# Patient Record
Sex: FEMALE | Race: WHITE | Employment: FULL TIME | URBAN - METROPOLITAN AREA
[De-identification: names, ages, dates, MRNs, and addresses within clinical notes are randomized per-mention and may not be internally consistent; named-entity substitution may affect disease eponyms.]

---

## 2019-04-07 ENCOUNTER — APPOINTMENT (OUTPATIENT)
Dept: GENERAL RADIOLOGY | Age: 34
End: 2019-04-07
Payer: COMMERCIAL

## 2019-04-07 ENCOUNTER — HOSPITAL ENCOUNTER (EMERGENCY)
Age: 34
Discharge: HOME | End: 2019-04-07
Attending: EMERGENCY MEDICINE
Payer: COMMERCIAL

## 2019-04-07 VITALS
OXYGEN SATURATION: 99 % | DIASTOLIC BLOOD PRESSURE: 95 MMHG | HEART RATE: 71 BPM | RESPIRATION RATE: 16 BRPM | SYSTOLIC BLOOD PRESSURE: 148 MMHG | WEIGHT: 189.5 LBS

## 2019-04-07 DIAGNOSIS — S62.501A CLOSED NONDISPLACED FRACTURE OF PHALANX OF RIGHT THUMB, UNSPECIFIED PHALANX, INITIAL ENCOUNTER: Primary | ICD-10-CM

## 2019-04-07 PROCEDURE — 73130 X-RAY EXAM OF HAND: CPT

## 2019-04-07 PROCEDURE — 99283 EMERGENCY DEPT VISIT LOW MDM: CPT

## 2019-04-07 RX ORDER — OXYCODONE HYDROCHLORIDE AND ACETAMINOPHEN 5; 325 MG/1; MG/1
1-2 TABLET ORAL EVERY 8 HOURS PRN
Qty: 7 TABLET | Refills: 0 | Status: SHIPPED | OUTPATIENT
Start: 2019-04-07 | End: 2019-04-14

## 2019-04-07 RX ORDER — IBUPROFEN 800 MG/1
800 TABLET ORAL EVERY 6 HOURS PRN
COMMUNITY
End: 2020-05-24

## 2019-04-07 ASSESSMENT — PAIN DESCRIPTION - PAIN TYPE: TYPE: ACUTE PAIN

## 2019-04-07 ASSESSMENT — PAIN DESCRIPTION - LOCATION: LOCATION: FINGER (COMMENT WHICH ONE)

## 2019-04-07 ASSESSMENT — PAIN DESCRIPTION - DESCRIPTORS: DESCRIPTORS: CONSTANT

## 2019-04-07 ASSESSMENT — PAIN DESCRIPTION - ORIENTATION: ORIENTATION: RIGHT

## 2019-04-07 ASSESSMENT — PAIN SCALES - GENERAL: PAINLEVEL_OUTOF10: 6

## 2019-04-08 NOTE — ED PROVIDER NOTES
[facility]   eMERGENCY dEPARTMENT eNCOUnter      Pt Name: Jennifer Lozano  MRN: [id number]  [place] 1985  Date of evaluation: 4/7/2019  Provider: Rosana Falcon MD  PCP: Lj Pandya DO      CHIEF COMPLAINT       Chief Complaint   Patient presents with    Finger Injury     Pt slammed right thumb into car door. +Swelling/Bruising       HISTORY OFPRESENT ILLNESS   (Location/Symptom, Timing/Onset, Context/Setting, Quality, Duration, Modifying Factors,Severity)  Note limiting factors. Jennifer Lozano is a 37 y.o. female  And her finger in a door it's the right hand thumb she says she is left-handed having 6 out of 10 pain it happened about an hour ago  Nursing Notes were all reviewed and agreed with or any disagreements were addressed  in the HPI. REVIEW OF SYSTEMS    (2-9 systems for level 4, 10 or more for level 5)     Review of Systems    Positives and Pertinent negatives as per HPI. Except as noted above in the ROS, all other systems were reviewed andnegative. PASTMEDICAL HISTORY   History reviewed. No pertinent past medical history. SURGICAL HISTORY       Past Surgical History:   Procedure Laterality Date    LEEP           CURRENT MEDICATIONS       Discharge Medication List as of 4/7/2019  8:57 PM      CONTINUE these medications which have NOT CHANGED    Details   ibuprofen (ADVIL;MOTRIN) 800 MG tablet Take 800 mg by mouth every 6 hours as needed for PainHistorical Med             ALLERGIES     Patient has no known allergies. FAMILY HISTORY     History reviewed. No pertinent family history.        SOCIAL HISTORY       Social History     Socioeconomic History    Marital status:      Spouse name: None    Number of children: None    Years of education: None    Highest education level: None   Occupational History    None   Social Needs    Financial resource strain: None    Food insecurity:     Worry: None     Inability: None    Transportation needs:     Medical: None     Non-medical: None   Tobacco Use    Smoking status: Never Smoker    Smokeless tobacco: Never Used   Substance and Sexual Activity    Alcohol use: None    Drug use: None    Sexual activity: None   Lifestyle    Physical activity:     Days per week: None     Minutes per session: None    Stress: None   Relationships    Social connections:     Talks on phone: None     Gets together: None     Attends [employer] service: None     Active member of club or organization: None     Attends meetings of clubs or organizations: None     Relationship status: None    Intimate partner violence:     Fear of current or ex partner: None     Emotionally abused: None     Physically abused: None     Forced sexual activity: None   Other Topics Concern    None   Social History Narrative    None       SCREENINGS      @FLOW([id number])@      PHYSICAL EXAM    (up to 7 for level 4, 8 or more for level 5)     ED Triage Vitals [04/07/19 2018]   BP Temp Temp Source Pulse Resp SpO2 Height Weight   (!) 148/95 -- Oral 71 16 99 % -- 189 lb 8 oz (86 kg)       Physical Exam      General Appearance:  Alert, cooperative, no distress, appears stated age. Head:  Normocephalic, without obviousabnormality, atraumatic. Eyes:  conjunctiva/corneas clear, EOM's intact. Sclera anicteric. ENT: Mucous membranes moist.                    There is a small subungual hematoma it measures less than a quarter of the nail bed there is also some bruising posteriorly of the thumb Refills less than 2 seconds   Extremities: No edema, cords or calf tenderness. Full range of motion. Pulses: 2+ and symmetric   Skin: Turgor is normal, no rashes or lesions. Neurologic: Alert and oriented X 3. No focal findings.   Motor grossly normal.  Speech clear, no drift, CN III-XII grossly intact,        DIAGNOSTIC RESULTS   LABS:    Labs Reviewed - No data to display    All other labs were within normal range or not returned as of this dictation. EKG: All EKG's are interpreted by the Emergency Department Physician who eithersigns or Co-signs this chart in the absence of a cardiologist.        RADIOLOGY:   Non-plain film images such as CT, Ultrasound and MRI are read by the radiologist. Plain radiographic images are visualized by myself. *    Interpretation per the Radiologist below, if available at the time of this note:    XR HAND RIGHT (MIN 3 VIEWS)   Final Result   Impression:    First distal phalanx fracture. PROCEDURES   Unless otherwise noted below, none     Procedures    *    CRITICAL CARE TIME   N/A      EMERGENCY DEPARTMENT COURSE and DIFFERENTIALDIAGNOSIS/MDM:   Vitals:    Vitals:    04/07/19 2018   BP: (!) 148/95   Pulse: 71   Resp: 16   TempSrc: Oral   SpO2: 99%   Weight: 86 kg (189 lb 8 oz)       Patient was given thefollowing medications:  Medications - No data to display        The patient tolerated their visit well. The patient and / or the familywere informed of the results of any tests, a time was given to answer questions. FINAL IMPRESSION      1. Closed nondisplaced fracture of phalanx of right thumb, unspecified phalanx, initial encounter          DISPOSITION/PLAN   DISPOSITION Decision To Discharge 04/07/2019 08:46:11 PM  Plan is for discharge pain medication from splint and follow-up with orthopedics hand    PATIENT REFERRED TO:  Ascencion Vazquez MD  [facility]  [place] [address] [place] [address]  [phone]    In 2 days        DISCHARGE MEDICATIONS:  Discharge Medication List as of 4/7/2019  8:57 PM      START taking these medications    Details   oxyCODONE-acetaminophen (PERCOCET) 5-325 MG per tablet Take 1-2 tablets by mouth every 8 hours as needed for Pain for up to 7 days. , Disp-7 tablet, R-0Print             DISCONTINUED MEDICATIONS:  Discharge Medication List as of 4/7/2019  8:57 PM                 (Please note that portions of this note were completed with a voice recognition program. Efforts were made to edit the dictations but occasionally words are mis-transcribed.)    Venkat Hung MD (electronically signed)      Venkat Hung MD  04/07/19 7540

## 2019-04-11 ENCOUNTER — HOSPITAL ENCOUNTER (OUTPATIENT)
Dept: OCCUPATIONAL THERAPY | Age: 34
Setting detail: THERAPIES SERIES
Discharge: HOME | End: 2019-04-11
Payer: COMMERCIAL

## 2019-04-11 ENCOUNTER — OFFICE VISIT (OUTPATIENT)
Dept: ORTHOPEDIC SURGERY | Age: 34
End: 2019-04-11
Payer: COMMERCIAL

## 2019-04-11 DIAGNOSIS — S62.524A CLOSED NONDISPLACED FRACTURE OF DISTAL PHALANX OF RIGHT THUMB, INITIAL ENCOUNTER: Primary | ICD-10-CM

## 2019-04-11 PROCEDURE — L3933 FO W/O JOINTS CF: HCPCS | Performed by: OCCUPATIONAL THERAPIST

## 2019-04-11 PROCEDURE — 99203 OFFICE O/P NEW LOW 30 MIN: CPT | Performed by: ORTHOPAEDIC SURGERY

## 2019-04-11 NOTE — PLAN OF CARE
[facility]  [address], [address] [address], [address]  Phone: [phone] Fax: [phone]    []     Patient: Chyna Olivares   : 1985  MRN: [id number]  Referring Physician:  Dr. Nirali Velez    Evaluation Date: 2019      Medical Diagnosis Information:    W59.045T (ICD-10-CM) - Closed nondisplaced fracture of distal phalanx of right [place]  Dear  Dr. Nirali Velez ,  The following patient has been evaluated for occupational therapy services for fabrication of a custom orthosis. Insurance requires the referring physician to review the treatment plan. Please review the attached evaluation and/or summary of the patient's plan of care, and verify that you agree by signing the attached document and sending it back to our office. Plan of Care/Treatment to date:  [x] Fabrication of custom orthosis-L 3933 static finger orthosis, tip protector  [x] Instruction on splint use, care and wearing schedule      [x] Follow up as needed for splint modifications          Frequency/Duration:  [x] One time visit for splint fabrication and instructions. Follow up as needed for splint modifcations      [] Splint fabricated, patient to return for full evaluation. Rehab Potential: [x] good [] fair  [] poor     SPLINT EVALUATION    Date: 2019  Name: Chyna Olivares            : 1985      Medical/Treatment Diagnosis Information:  ·   I92.807W (ICD-10-CM) - Closed nondisplaced fracture of distal phalanx of right thumb  Insurance/Certification information:     Physician Information:   Dr. Nirali Velez       Next MD Appointment: 19    Subjective  History of Injury/ Mechanism of Injury: 19 slammed her right thumb in the car door, sen in ER and placed in alumifoam splint, referred today for custom splint ot support the fracture.   Onset/Surgery Date: see above  Dominant Hand:    [x] Right

## 2019-04-11 NOTE — PROGRESS NOTES
Chief Complaint  Finger Pain (right thumb )      History of Present Illness:  Jesusita Villarreal is a 37 y.o. female , right-hand-dominant, presenting with history of right thumb injury  The patient sustained a crush injury to her thumb on her right thumb was crushed in a car to her. Date of injury: 4/7/2019  She presented to [facility] emergency department where images obtained demonstrated a nondisplaced distal phalanx fracture of her right thumb  She subsequently was placed into a thumb brace/splint and is here today for evaluation and extension reports sensitivity and pain with swelling in her thumb tip  She denies any prior history of injury or surgery to her right thumb, no other injuries reported today    Medical History  Patient's medications, allergies, past medical, surgical, social and family histories were reviewed and updated as appropriate. Review of Systems  Pertinent items are noted in HPI  Review of systems reviewed from Patient History Form dated on 4/11/19 and available in the patient's chart under the Media tab. Vital Signs  There were no vitals filed for this visit. General Exam:   Constitutional: Patient is adequately groomed with no evidence of malnutrition  Mental Status: The patient is oriented to time, place and person. The patient's mood and affect are appropriate. Lymphatic: The lymphatic examination bilaterally reveals all areas to be without enlargement or induration. Neurological: The patient has good coordination. There is no weakness or sensory deficit.     Right thumb/hand Examination  Inspection: There is diffuse mild swelling of the right thumb tip without obvious malrotation or angulation  Normal thumb tenodesis and cascade  Subungual hematoma with faint purplish discoloration, no evidence of unstable nail plate  No additional skin changes    Palpation:  Diffuse tenderness at thumb tip including thumb pulp and near nail sulcus  No palpable crepitus or fluctuance and

## 2019-04-12 ENCOUNTER — TELEPHONE (OUTPATIENT)
Dept: ORTHOPEDIC SURGERY | Age: 34
End: 2019-04-12

## 2019-04-25 ENCOUNTER — OFFICE VISIT (OUTPATIENT)
Dept: ORTHOPEDIC SURGERY | Age: 34
End: 2019-04-25
Payer: COMMERCIAL

## 2019-04-25 DIAGNOSIS — S62.524A CLOSED NONDISPLACED FRACTURE OF DISTAL PHALANX OF RIGHT THUMB, INITIAL ENCOUNTER: Primary | ICD-10-CM

## 2019-04-25 PROCEDURE — 99213 OFFICE O/P EST LOW 20 MIN: CPT | Performed by: ORTHOPAEDIC SURGERY

## 2019-04-26 NOTE — PROGRESS NOTES
and cascade   Subungual hematoma with what appears to be resolving hematoma faint purplish discoloration near eponychial fold  No additional skin changes     Palpation:   minimal tenderness at thumb tip, sensitivity to light touch at the distal aspect of the thumb pulp palpable fluctuance and no instability  No palpable crepitus or fluctuance and nontender throughout the remainder of thumb     Range of Motion:  Patient demonstrates active thumb EPL and FPL with limited motion and IP joint with encouragement patient demonstrates approximately 60° of active IP joint flexion and 10° of hyperextension, otherwise full composite fist and extension of all other fingers     Strength: Active EPL and FPL with limited motion and testing  Special Tests:  Sensation globally altered and thumb tip but present and radial and ulnar aspects of thumb tip  Capillary refill brisk in thumb tip        Capillary refill brisk all fingers, symmetric  Gross sensation intact to light touch median/ulnar/radial nerves  Sensation intact to radial/ulnar aspect of fingertip        Radiology:    X-rays obtained and reviewed in office:  No new images obtained today     Additional Diagnostic Test Findings:    Office Procedures:  Orders Placed This Encounter   Procedures    [facility] OT - [place] Occupation Therapy     Referral Priority:   Routine     Referral Type:   Eval and Treat     Referral Reason:   Specialty Services Required     Requested Specialty:   Occupational Therapy     Number of Visits Requested:   1           Zahra Rivera MD  Orthopaedic Surgeon, [address]    Contact Information:  [facility]: [phone] [id number] Clinical )    This dictation was performed with a verbal recognition program [facility] ) and it was checked for errors. It is possible that there are still dictated errors within this office note. If so, please bring any errors to my attention for an addendum.   All

## 2019-04-29 ENCOUNTER — HOSPITAL ENCOUNTER (OUTPATIENT)
Dept: OCCUPATIONAL THERAPY | Age: 34
Setting detail: THERAPIES SERIES
Discharge: HOME | End: 2019-04-29
Payer: COMMERCIAL

## 2019-04-29 ENCOUNTER — TELEPHONE (OUTPATIENT)
Dept: ORTHOPEDIC SURGERY | Age: 34
End: 2019-04-29

## 2019-04-29 PROCEDURE — 97530 THERAPEUTIC ACTIVITIES: CPT | Performed by: OCCUPATIONAL THERAPIST

## 2019-04-29 PROCEDURE — 97165 OT EVAL LOW COMPLEX 30 MIN: CPT | Performed by: OCCUPATIONAL THERAPIST

## 2019-04-29 PROCEDURE — 97022 WHIRLPOOL THERAPY: CPT | Performed by: OCCUPATIONAL THERAPIST

## 2019-04-29 PROCEDURE — 97110 THERAPEUTIC EXERCISES: CPT | Performed by: OCCUPATIONAL THERAPIST

## 2019-04-29 NOTE — PLAN OF CARE
[facility], [facility]  [address],  [address], [address] [facility]  Phone: [phone] Fax: [phone]      Occupational Gladis Pratt  Dear Referring Practitioner: Nancy Smith MD,     We had the pleasure of evaluating the following patient for occupational therapy services at [address] [place] [address]. A summary of our findings can be found in the initial assessment below. This includes our plan of care. If you have any questions or concerns regarding these findings, please do not hesitate to contact me at the office phone number checked above. Thank you for the referral.     Physician Signature:_______________________________Date:__________________  By signing above (or electronic signature), therapists plan is approved by physician      Patient: Chadwick Kaur   : 1985   MRN: [id number]  Referring Physician: Referring Practitioner: Nancy Smith MD      Evaluation Date: 2019      Medical Diagnosis Information:  Diagnosis: G72.988F (ICD-10-CM) - Closed nondisplaced fracture of distal phalanx of right thumb    Treatment Diagnosis: R thumb/hand stiffness - M25.641              Insurance information:    Date of Injury: 19  Date of Surgery: NA      Precautions/ Contra-indications: per 19 MD note -progressive IP joint range of motion as tolerated and desensitization strategies  She will remain off of work to protect her thumb, continued intermittent use of tip protector    Latex Allergy:  []No      []Yes  Pacemaker:  [] No       [] Yes     Preferred Language for Healthcare:   [x]English       []other:    [x] Patient reported history, allergies, and medications reviewed - see intake form.      SUBJECTIVE: Patient reported deficits/history of current problem: slammed thumb in car door on 19; referred to hand therapy for splint fabrication (tip protector) on 19; seen by MD on  and referred to therapy for progression of ROM and desensitization    Functional Disability Index: Quick DASH score - 52% taken on 4/29/2019    Pain Scale: 1-2/10   [x]Constant      []Intermittent    []other:  Pain Location:  R thumb  Easing factors: rest  Provocative factors: motion, activities      Occupational Profile:  Home Enviroment: lives with  [] spouse,  [] family,  [] alone,  [x] significant other,   [] other:    Occupation/School: cable tech [employer]    Recreational Activities/Meaningful Interests: cycling, biking    Prior Level of Function: [x] Independent with ADLs/IADLs     [] Assistance needed (describe):    Patient-Identified Primary Performance Deficits (to be addressed in POC):   [] bathing    [x] household tasks - opening jars   [x] dressing - donning bra    [x] self feeding - cutting food   [] grooming    [] work/education   [] functional mobility   [] sleeping/rest   [] toileting/hygiene   [] recreational activities   [x] driving    [] community/social participation   [] other:     Comorbidities Affecting Functional Performance:     [x]Anxiety (F41.9)/Depression (F32.9)   []Diabetes Type 1(E10.65) or 2 (E11.65)   []Rheumatoid Arthritis (M05.9)  []Fibromyalgia (M79.7)  []Neuropathy(G60.9)  []Osteoarthritis(M19.91)  []None   []Other:    Hand Dominance:    []  Right    [x] Left      OBJECTIVE:    Involved    AROM: Right Left   Thumb MP  IP 0/42  >0/64 0/50  >0/72   Thumb tip to DPFC 1.5cm 0cm   Thumb RA               PA 40  45 55  45        Edema:     Thumb IP 6.3cm 6.2cm        Strength: Contraindicated    Not Tested     Observations (including splints, bandages, incisions, scars):   Dried skin noted volar R digital MP's from a scald injury which occurred ~2 weeks prior to injury; dark ecchymosis at base of thumbnail    Sensation:  [] No reported deficits  [x] Intact to light touch    [] [place] Nasrin test completed, findings as noted:  [] Other:    Palpation: mild tenderness and hypersensitivity reported evaluation/treatment:    [] Excellent [x] Good [] Fair  [] Poor    PLAN OF CARE:  Interventions:   [x] Therapeutic Exercise [x] Therapeutic Activity    [x] Activities of Daily Living [x] Neuromuscular Re-education      [x] Patient Education  [x] Manual Therapy      [x] Modalities as needed, and not otherwise contraindicated, including: ultrasound,paraffin,moist heat/cold pack, electrical stimulation, contrast bath, iontophoresis  [] Splinting    Frequency/Duration:  1 days per week for 4-6 weeks      GOALS:  Short Term Goals: To be achieved in: 2 weeks  1. Independent in HEP and progression per patient tolerance, in order to prevent re-injury. 2. Patient will have a decrease in pain to facilitate improvement in movement, function, and ADLs as indicated by Functional Deficits. Long Term Goals to be achieved in 4-6  weeks, including patient directed goals to address identified performance deficits:  1) Pt to be independent in graded HEP progression with a good level of effort and compliance. 2) Pt to report a score of 30 % or less on the Quick DASH disability questionnaire for increased performance with carrying, moving, and handling objects. 3) Pt will demonstrate increased ROM to R thumb flexion to </=0.5cm to [facility] for improved independence with dressing/donning bra. 4) Pt will demonstrate increased strength to R  >/= 75% of L for improved independence with opening jars  5) Pt will demonstrate increased pinch strengths to R >/= 75% of L for improved independence with use of feeding utensils.   6) Pt will have a decrease in pain to 0-1/10 to facilitate ease with driving.  )      OCCUPATIONAL THERAPY EVALUATION COMPLEXITY JUSTIFICATION:    [x] An occupational profile and medical/therapy history, which includes:   [x] a brief history including medical and/or therapy records relating to the     presenting problem   [] an expanded review of medical and/or therapy records and additional review     of physical, cognitive or psychosocial history related to current functional    performance   [] an extensive additional review of review of medical and/or therapy records and physical, cognitive, or psychosocial history related to current    functional performance    [x] An assessment that identifies performance deficits (relating to physical, cognitive, or psychosocial skills) that result in activity limitations and/or participation restrictions:   [x] 1-3 performance deficits   [] 3-5 performance deficits   [] 5 or more performance deficits    [x] Clinical decision making of:   [x] low complexity, including analysis of occupational profile, data analysis from problem focused assessment, and consideration of a limited number of treatment options. No comorbidities affect occupational performance. No task modifications or assistance needed to complete evaluation. [] moderate complexity, including analysis of occupational profile, data analysis from detailed assessment and consideration of several treatment options. Comorbidities that affect occupational performance may be present. Minimal to moderate task modifications or assistance needed to complete assessment. [] high complexity, including analysis of occupational profile, analysis of data from comprehensive assessment and consideration of multiple treatment options. Multiple comorbidities present that affect occupational performance. Significant task modifications or assistance needed to complete assessment. Evaluation Code:  [x] Low Complexity EVAL 71310 (typically 30 minutes face to face)  [] Mod Complexity EVAL 19637 (typically 45 minutes face to face)  [] High Complexity EVAL 12727 (typically 60 minutes face to face)    Electronically signed by:   Willow Vargas/L, PT, MPT, [address] [place] [address], [id number], [id number]

## 2019-04-29 NOTE — FLOWSHEET NOTE
ROM  for improvements in scapular, scapulothoracic and UE control with self care, reaching, carrying, lifting, house/yardwork, driving/computer work. [x] (96891) Provided verbal/tactile cueing for activities related to improving balance, coordination, kinesthetic sense, posture, motor skill, proprioception  to assist with  scapular, scapulothoracic and UE control with self care, reaching, carrying, lifting, house/yardwork, driving/computer work.   [] Comments:    Therapeutic Activities:    [x] (64766 or 71941) Provided verbal/tactile cueing for activities related to improving balance, coordination, kinesthetic sense, posture, motor skill, proprioception and motor activation to allow for proper function of scapular, scapulothoracic and UE control with self care, carrying, lifting, driving/computer work  [] Comments:    Home Exercise Program:    [x] (87321) Reviewed/Progressed HEP activities related to strengthening, flexibility, endurance, ROM of scapular, scapulothoracic and UE control with self care, reaching, carrying, lifting, house/yardwork, driving/computer work  [] (78004) Reviewed/Progressed HEP activities related to improving balance, coordination, kinesthetic sense, posture, motor skill, proprioception of scapular, scapulothoracic and UE control with self care, reaching, carrying, lifting, house/yardwork, driving/computer work    [x] Comments: see sheet    Manual Treatments:  PROM / STM / Oscillations-Mobs:  G-I, II, III, IV (PA's, Inf., Post.)  [x] (68115) Provided manual therapy to mobilize soft tissue/joints of cervical/CT, scapular GHJ and UE for the purpose of modulating pain, promoting relaxation,  increasing ROM, reducing/eliminating soft tissue swelling/inflammation/restriction, improving soft tissue extensibility and allowing for proper ROM for normal function with self care, reaching, carrying, lifting, house/yardwork, driving/computer work  [x] Comments: 2' STM, retrograde massage    ADL Training:  [] (58476) Provided self-care/home management training related to activities of daily living and compensatory training, and/or use of adaptive equipment   [] Comments:     Splinting:  [] Fabrication of:   [] (12330) Orthotic/Prosthetic Management, subsequent encounter  [] (45242) Orthotic management and training (fitting and assessment)  [] Comments:    Charges:  Timed Code Treatment Minutes: 30   Total Treatment Minutes: 65     [x] EVAL (LOW) 56667   [] OT Re-eval (48595)  [] EVAL (MOD) 77198   [] EVAL (HIGH) 37369       [x] Keith (32799) x     [] NEOVJ(22157)  [] NMR (80776) x     [] Estim (attended) (06862)   [] Manual ([id number]) x     [] US (77650)  [x] TA (63416) x 1    [] Paraffin (09710)  [] ADL  (88 649 24 60) x    [] Splint/L code:    [] Estim (unattended) 33 [phone] 31)  [x] Fluidotherapy (70778)  [] Other:    GOALS:  Short Term Goals: To be achieved in: 2 weeks  1. Independent in HEP and progression per patient tolerance, in order to prevent re-injury. 2. Patient will have a decrease in pain to facilitate improvement in movement, function, and ADLs as indicated by Functional Deficits. Long Term Goals to be achieved in 4-6  weeks, including patient directed goals to address identified performance deficits:  1) Pt to be independent in graded HEP progression with a good level of effort and compliance. 2) Pt to report a score of 30 % or less on the Quick DASH disability questionnaire for increased performance with carrying, moving, and handling objects. 3) Pt will demonstrate increased ROM to R thumb flexion to </=0.5cm to [facility] for improved independence with dressing/donning bra. 4) Pt will demonstrate increased strength to R  >/= 75% of L for improved independence with opening jars  5) Pt will demonstrate increased pinch strengths to R >/= 75% of L for improved independence with use of feeding utensils.   6) Pt will have a decrease in pain to 0-1/10 to facilitate ease with driving.  )      Progression Towards Functional goals:  [] Patient is progressing as expected towards functional goals listed. [] Progression is slowed due to complexities listed. [] Progression has been slowed due to co-morbidities. [x] Plan just implemented, too soon to assess goals progression  [] All goals are met  [] Other:     ASSESSMENT:  See eval    Treatment/Activity Tolerance:  [x] Patient tolerated treatment well [] Patient limited by fatique  [] Patient limited by pain  [] Patient limited by other medical complications  [] Other:     Prognosis: [x] Good [] Fair  [] Poor    Patient Requires Follow-up: [x] Yes  [] No    PLAN: See eval  [] Continue per plan of care [] Alter current plan (see comments)  [x] Plan of care initiated [] Hold pending MD visit [] Discharge    If patient does not return for further follow ups after this date, please consider this as the patient's discharge from occupational therapy. Electronically signed by:  Willow Jenkins OTJAMIE/L, PT, MPT, [address] [place] [address], [phone], [phone]

## 2019-04-30 ENCOUNTER — TELEPHONE (OUTPATIENT)
Dept: ORTHOPEDIC SURGERY | Age: 34
End: 2019-04-30

## 2019-05-06 ENCOUNTER — OFFICE VISIT (OUTPATIENT)
Dept: ORTHOPEDIC SURGERY | Age: 34
End: 2019-05-06
Payer: COMMERCIAL

## 2019-05-06 ENCOUNTER — HOSPITAL ENCOUNTER (OUTPATIENT)
Dept: OCCUPATIONAL THERAPY | Age: 34
Setting detail: THERAPIES SERIES
Discharge: HOME | End: 2019-05-06
Payer: COMMERCIAL

## 2019-05-06 DIAGNOSIS — S62.524A CLOSED NONDISPLACED FRACTURE OF DISTAL PHALANX OF RIGHT THUMB, INITIAL ENCOUNTER: Primary | ICD-10-CM

## 2019-05-06 PROCEDURE — 99213 OFFICE O/P EST LOW 20 MIN: CPT | Performed by: ORTHOPAEDIC SURGERY

## 2019-05-06 PROCEDURE — 97110 THERAPEUTIC EXERCISES: CPT | Performed by: OCCUPATIONAL THERAPIST

## 2019-05-06 NOTE — FLOWSHEET NOTE
65     L  - 95  Lateral Pinch - R - 3  L - 18  Three Point PInch - R- 2  L - 16           Date:  4/29/2019 5/6/19      Objective Measures:         See above                       Modalities:        Fluidotherapy 20' Hot pack with gentle passive thumb flexion                      Therapeutic Exercise, Activities, NMR:        AROM 10x each IP, MP, CMC thumb; thumb opp, ext/flex - see sheet 10x each IP, MP, CMC thumb; thumb opp, ext/flex -     Gentle POM to thumb      Therapeutic Activities Gentle poking into soft yellow putty x ~30    Thumb to base of SF to remove small pegs x 40    Thumb to IF opposition to place pegs into board x 40       Strengthening  Blue foam cube pinching, gripping and maniplating in hand. Issued for and instructed in for home. Therapeutic Exercise and NMR:  [x] (88651) Provided verbal/tactile cueing for activities related to strengthening, flexibility, endurance, ROM  for improvements in scapular, scapulothoracic and UE control with self care, reaching, carrying, lifting, house/yardwork, driving/computer work.    [] (80250) Provided verbal/tactile cueing for activities related to improving balance, coordination, kinesthetic sense, posture, motor skill, proprioception  to assist with  scapular, scapulothoracic and UE control with self care, reaching, carrying, lifting, house/yardwork, driving/computer work.   [] Comments:    Therapeutic Activities:    [] (38169 or 75947) Provided verbal/tactile cueing for activities related to improving balance, coordination, kinesthetic sense, posture, motor skill, proprioception and motor activation to allow for proper function of scapular, scapulothoracic and UE control with self care, carrying, lifting, driving/computer work  [] Comments:    Home Exercise Program:    [x] (28451) Reviewed/Progressed HEP activities related to strengthening, flexibility, endurance, ROM of scapular, scapulothoracic and UE control with self care, reaching, carrying, lifting, house/yardwork, driving/computer work  [] (10656) Reviewed/Progressed HEP activities related to improving balance, coordination, kinesthetic sense, posture, motor skill, proprioception of scapular, scapulothoracic and UE control with self care, reaching, carrying, lifting, house/yardwork, driving/computer work    [x] Comments: see sheet    Manual Treatments:  PROM / STM / Oscillations-Mobs:  G-I, II, III, IV (PA's, Inf., Post.)  [] (83276) Provided manual therapy to mobilize soft tissue/joints of cervical/CT, scapular GHJ and UE for the purpose of modulating pain, promoting relaxation,  increasing ROM, reducing/eliminating soft tissue swelling/inflammation/restriction, improving soft tissue extensibility and allowing for proper ROM for normal function with self care, reaching, carrying, lifting, house/yardwork, driving/computer work  [] Comments: 2' STM, retrograde massage    ADL Training:  [] (05433) Provided self-care/home management training related to activities of daily living and compensatory training, and/or use of adaptive equipment   [] Comments:     Splinting:  [] Fabrication of:   [] (36130) Orthotic/Prosthetic Management, subsequent encounter  [] (95679) Orthotic management and training (fitting and assessment)  [] Comments:    Charges:  Timed Code Treatment Minutes: 30   Total Treatment Minutes: 40     [] EVAL (LOW) 26398   [] OT Re-eval (27622)  [] EVAL (MOD) 50054   [] EVAL (HIGH) 32860       [x] Keith (48095) x 2     [] FRRKC(15341)  [] NMR (33237) x     [] Estim (attended) (76832)   [] Manual ([id number]) x     [] US (51996)  [] TA (13376) x     [] Paraffin (20454)  [] ADL  (82 649 24 60) x    [] Splint/L code:    [] Estim (unattended) (22 444223)  [] Fluidotherapy (91099)  [] Other:    GOALS:  Short Term Goals: To be achieved in: 2 weeks  1. Independent in HEP and progression per patient tolerance, in order to prevent re-injury.    2. Patient will have a decrease in pain to facilitate improvement in movement, function, and ADLs as indicated by Functional Deficits. Long Term Goals to be achieved in 4-6  weeks, including patient directed goals to address identified performance deficits:  1) Pt to be independent in graded HEP progression with a good level of effort and compliance. 2) Pt to report a score of 30 % or less on the Quick DASH disability questionnaire for increased performance with carrying, moving, and handling objects. 3) Pt will demonstrate increased ROM to R thumb flexion to </=0.5cm to [facility] for improved independence with dressing/donning bra. 4) Pt will demonstrate increased strength to R  >/= 75% of L for improved independence with opening jars  5) Pt will demonstrate increased pinch strengths to R >/= 75% of L for improved independence with use of feeding utensils. 6) Pt will have a decrease in pain to 0-1/10 to facilitate ease with driving.  )      Progression Towards Functional goals:  [x] Patient is progressing as expected towards functional goals listed. [] Progression is slowed due to complexities listed. [] Progression has been slowed due to co-morbidities. [] Plan just implemented, too soon to assess goals progression  [] All goals are met  [] Other:     ASSESSMENT:  Good gains in ROM, beginning work on strength. Treatment/Activity Tolerance:  [x] Patient tolerated treatment well [] Patient limited by fatique  [] Patient limited by pain  [] Patient limited by other medical complications  [] Other:     Prognosis: [x] Good [] Fair  [] Poor    Patient Requires Follow-up: [x] Yes  [] No    PLAN: See eval  [x] Continue per plan of care [] Alter current plan (see comments)  [] Plan of care initiated [] Hold pending MD visit [] Discharge    If patient does not return for further follow ups after this date, please consider this as the patient's discharge from occupational therapy.     Electronically signed by: Karoline Castillo, [address] [place] [id number]

## 2019-05-06 NOTE — PROGRESS NOTES
swelling, subungual hematoma with advancement of new nail growth and pushing outwards of old nail plate  No additional skin changes     Palpation:   minimal tenderness at thumb tip, sensitivity to light touch at the distal aspect of the thumb pulp palpable fluctuance and no instability  No palpable crepitus or fluctuance and nontender throughout the remainder of thumb     Range of Motion:  Patient demonstrates active thumb EPL and FPL with overall IP joint range of motion 60-70° with isolated testing     Strength:  Active 5 out of 5 EPL and FPL with limited motion and testing      Special Tests:  Sensation globally altered and thumb tip but present and radial and ulnar aspects of thumb tip  Capillary refill brisk in thumb tip        Capillary refill brisk all fingers, symmetric  Gross sensation intact to light touch median/ulnar/radial nerves  Sensation intact to radial/ulnar aspect of fingertip        Radiology:    X-rays obtained and reviewed in office:  No new images obtained today     Additional Diagnostic Test Findings:    Office Procedures:  No orders of the defined types were placed in this encounter. Alvin Reyes MD  Orthopaedic Surgeon, [address]    Contact Information:  Aditya Morton: [phone]  Clinical )    This dictation was performed with a verbal recognition program [facility] ) and it was checked for errors. It is possible that there are still dictated errors within this office note. If so, please bring any errors to my attention for an addendum. All efforts were made to ensure that this office note is accurate.

## 2019-05-07 ENCOUNTER — TELEPHONE (OUTPATIENT)
Dept: ORTHOPEDIC SURGERY | Age: 34
End: 2019-05-07

## 2019-05-07 NOTE — TELEPHONE ENCOUNTER
FAXED [facility] ) [address] @ [address] @ [phone]

## 2019-05-10 ENCOUNTER — HOSPITAL ENCOUNTER (OUTPATIENT)
Dept: OCCUPATIONAL THERAPY | Age: 34
Setting detail: THERAPIES SERIES
Discharge: HOME | End: 2019-05-10
Payer: COMMERCIAL

## 2019-05-10 PROCEDURE — 97110 THERAPEUTIC EXERCISES: CPT | Performed by: OCCUPATIONAL THERAPIST

## 2019-05-10 PROCEDURE — 97022 WHIRLPOOL THERAPY: CPT | Performed by: OCCUPATIONAL THERAPIST

## 2019-05-10 NOTE — FLOWSHEET NOTE
[facility]  [address], [address] [address], [address]  Phone: [phone] Fax: [phone]      Hand Therapy Daily Treatment Note  Date:  5/10/2019    Patient: Chyna Olivares   : 1985   MRN: [id number]  Referring Physician: Referring Practitioner: Niki Padilla MD       Medical Diagnosis Information:  Diagnosis: K37.209H (ICD-10-CM) - Closed nondisplaced fracture of distal phalanx of right thumb    Treatment Diagnosis: R thumb/hand stiffness - M25.641                                         Insurance information:  [facility]  /Date of Injury:19  Date of Surgery:NA      Visit # Insurance Allowable   3      Date of Patient follow up with Physician: 19    Progress Note: []  Yes  [x]  No  Next due by: Visit #10      Latex Allergy:  []No      []Yes  Pacemaker:  [] No       [] Yes     Preferred Language for Healthcare:   [x]English       []other:    Subjective - Able to use more pressure on tip of right thumb.      Patient reported deficits/history of current problem: slammed thumb in car door on 19; referred to hand therapy for splint fabrication (tip protector) on 19; seen by MD on  and referred to therapy for progression of ROM and desensitization     Functional Disability Index: Quick DASH score - 52% taken on 2019    Pain level:  1-2/10     RESTRICTIONS/PRECAUTIONS:  Off work for two more weeks until next MD appointment, ok to begin resistve  an dpinch activities per MD    OBJECTIVE:                                                       4/29/19                 5/6/19                5/10/19   Involved     AROM: Right     Thumb MP  IP 0/42  >0/64 0/50  0/75 50  75   Thumb tip to DPFC 1.5cm     Thumb RA               PA 40  45             Edema:       Thumb IP 6.3cm 6.3            Strength: Contraindicated      - R - 65     L  - 95  Lateral Pinch - R - 3  L - 18  [place] PInch - R- 2  L - 16         7      4 Date:  4/29/2019 5/6/19 5/10/19     Objective Measures:         See above                       Modalities:        Fluidotherapy 20' Hot pack with gentle passive thumb flexion 20 minutes fluidotherapy                     Therapeutic Exercise, Activities, NMR:        AROM 10x each IP, MP, CMC thumb; thumb opp, ext/flex - see sheet 10x each IP, MP, CMC thumb; thumb opp, ext/flex -     Gentle PROM to thumb 10x each IP, MP, CMC thumb; thumb opp, ext/flex -     Gentle PROM to thumb     Therapeutic Activities Gentle poking into soft yellow putty x ~30    Thumb to base of SF to remove small pegs x 40    Thumb to IF opposition to place pegs into board x 40      Yellow pincher x 40    Alternated red and green hand griper x 40. Pressing thumb into putty, gripping with hand, manipulating into hand 5' alternately. Strengthening  Blue foam cube pinching, gripping and maniplating in hand. Issued for and instructed in for home. Therapeutic Exercise and NMR:  [x] (67914) Provided verbal/tactile cueing for activities related to strengthening, flexibility, endurance, ROM  for improvements in scapular, scapulothoracic and UE control with self care, reaching, carrying, lifting, house/yardwork, driving/computer work.    [] (67543) Provided verbal/tactile cueing for activities related to improving balance, coordination, kinesthetic sense, posture, motor skill, proprioception  to assist with  scapular, scapulothoracic and UE control with self care, reaching, carrying, lifting, house/yardwork, driving/computer work.   [] Comments:    Therapeutic Activities:    [] (78650 or 59936) Provided verbal/tactile cueing for activities related to improving balance, coordination, kinesthetic sense, posture, motor skill, proprioception and motor activation to allow for proper function of scapular, scapulothoracic and UE control with self care, carrying, lifting, driving/computer work  [] Comments:    Home Exercise Program:    [x] (11602) Reviewed/Progressed HEP activities related to strengthening, flexibility, endurance, ROM of scapular, scapulothoracic and UE control with self care, reaching, carrying, lifting, house/yardwork, driving/computer work  [] (14832) Reviewed/Progressed HEP activities related to improving balance, coordination, kinesthetic sense, posture, motor skill, proprioception of scapular, scapulothoracic and UE control with self care, reaching, carrying, lifting, house/yardwork, driving/computer work    [x] Comments: see sheet    Manual Treatments:  PROM / STM / Oscillations-Mobs:  G-I, II, III, IV (PA's, Inf., Post.)  [] (35757) Provided manual therapy to mobilize soft tissue/joints of cervical/CT, scapular GHJ and UE for the purpose of modulating pain, promoting relaxation,  increasing ROM, reducing/eliminating soft tissue swelling/inflammation/restriction, improving soft tissue extensibility and allowing for proper ROM for normal function with self care, reaching, carrying, lifting, house/yardwork, driving/computer work  [] Comments: 2' STM, retrograde massage    ADL Training:  [] (57489) Provided self-care/home management training related to activities of daily living and compensatory training, and/or use of adaptive equipment   [] Comments:     Splinting:  [] Fabrication of:   [] (38956) Orthotic/Prosthetic Management, subsequent encounter  [] (67121) Orthotic management and training (fitting and assessment)  [] Comments:    Charges:  Timed Code Treatment Minutes: 30   Total Treatment Minutes: 50     [] EVAL (LOW) 41890   [] OT Re-eval (49096)  [] EVAL (MOD) 93720   [] EVAL (HIGH) 48648       [x] Keith (95492) x 2     [] OTFHT(95386)  [] NMR (36482) x     [] Estim (attended) (62789)   [] Manual ([id number]) x     [] US (70308)  [] TA (02189) x     [] Paraffin (24854)  [] ADL  (88 649 24 60) x    [] Splint/L code:    [] Estim (unattended) (22 280865)  [x] Fluidotherapy (57306)  [] Other:    GOALS:  Short Term Goals: To be achieved in: 2 weeks  1. Independent in HEP and progression per patient tolerance, in order to prevent re-injury. 2. Patient will have a decrease in pain to facilitate improvement in movement, function, and ADLs as indicated by Functional Deficits. Long Term Goals to be achieved in 4-6  weeks, including patient directed goals to address identified performance deficits:  1) Pt to be independent in graded HEP progression with a good level of effort and compliance. 2) Pt to report a score of 30 % or less on the Quick DASH disability questionnaire for increased performance with carrying, moving, and handling objects. 3) Pt will demonstrate increased ROM to R thumb flexion to </=0.5cm to [facility] for improved independence with dressing/donning bra. 4) Pt will demonstrate increased strength to R  >/= 75% of L for improved independence with opening jars  5) Pt will demonstrate increased pinch strengths to R >/= 75% of L for improved independence with use of feeding utensils. 6) Pt will have a decrease in pain to 0-1/10 to facilitate ease with driving.  )      Progression Towards Functional goals:  [x] Patient is progressing as expected towards functional goals listed. [] Progression is slowed due to complexities listed. [] Progression has been slowed due to co-morbidities. [] Plan just implemented, too soon to assess goals progression  [] All goals are met  [x] Other:  Good gains in pinch strength    ASSESSMENT:  Good gains in ROM, beginning work on strength.     Treatment/Activity Tolerance:  [x] Patient tolerated treatment well [] Patient limited by fatique  [] Patient limited by pain  [] Patient limited by other medical complications  [] Other:     Prognosis: [x] Good [] Fair  [] Poor    Patient Requires Follow-up: [x] Yes  [] No    PLAN: See eval  [x] Continue per plan of care [] Alter current plan (see comments)  [] Plan of care initiated [] Hold pending MD visit [] Discharge    If patient does not return for further follow ups after this date, please consider this as the patient's discharge from occupational therapy.     Electronically signed by: Wilfred Pearson, [address] [place] [phone]

## 2019-05-14 ENCOUNTER — HOSPITAL ENCOUNTER (OUTPATIENT)
Dept: OCCUPATIONAL THERAPY | Age: 34
Setting detail: THERAPIES SERIES
Discharge: HOME | End: 2019-05-14
Payer: COMMERCIAL

## 2019-05-14 PROCEDURE — 97110 THERAPEUTIC EXERCISES: CPT | Performed by: OCCUPATIONAL THERAPIST

## 2019-05-14 NOTE — FLOWSHEET NOTE
[facility], [place]  [address], [address] [facility], [address] [facility]  Phone: [phone] Fax: [phone]      Hand Therapy Daily Treatment Note  Date:  2019    Patient: Davi Schuster   : 1985   MRN: [id number]  Referring Physician: Referring Practitioner: Grover Padilla MD       Medical Diagnosis Information:  Diagnosis: R38.110W (ICD-10-CM) - Closed nondisplaced fracture of distal phalanx of right thumb    Treatment Diagnosis: R thumb/hand stiffness - M25.641                                         Insurance information:  Damon [address] 150  /Date of Injury:19  Date of Surgery:NA      Visit # Insurance Allowable   4      Date of Patient follow up with Physician: 19    Progress Note: []  Yes  [x]  No  Next due by: Visit #10      Latex Allergy:  []No      []Yes  Pacemaker:  [] No       [] Yes     Preferred Language for Healthcare:   [x]English       []other:    Subjective - Feel like she may have over did it with her thumb and it is sore.      Patient reported deficits/history of current problem: slammed thumb in car door on 19; referred to hand therapy for splint fabrication (tip protector) on 19; seen by MD on  and referred to therapy for progression of ROM and desensitization     Functional Disability Index: Quick DASH score - 52% taken on 2019    Pain level:  1-2/10     RESTRICTIONS/PRECAUTIONS:  Off work for two more weeks until next MD appointment, ok to begin resistve  an dpinch activities per MD    OBJECTIVE:                                                       4/29/19                 5/6/19                5/10/19           5/14/19   Involved      AROM: Right      Thumb MP  IP 0/42  >0/64 0/50  0/75 50  75    Thumb tip to DPFC 1.5cm      Thumb RA               PA 40  45               Edema:        Thumb IP 6.3cm 6.3              Strength: Contraindicated      - R - 65     L  - 95  Lateral Pinch - R - 3  L - 18  [place] PInch - R- 2  L - 16         7      4 65      12      4         Date:  4/29/2019 5/6/19 5/10/19 5/14/19    Objective Measures:         See above                       Modalities:        Fluidotherapy 20' Hot pack with gentle passive thumb flexion 20 minutes fluidotherapy Hot pack 10 min prior to tx        Cold pack 5 ' after tx. Therapeutic Exercise, Activities, NMR:        AROM 10x each IP, MP, CMC thumb; thumb opp, ext/flex - see sheet 10x each IP, MP, CMC thumb; thumb opp, ext/flex -     Gentle PROM to thumb 10x each IP, MP, CMC thumb; thumb opp, ext/flex -     Gentle PROM to thumb Passive thumb flex and ext 15' holds x 5 of each    Therapeutic Activities Gentle poking into soft yellow putty x ~30    Thumb to base of SF to remove small pegs x 40    Thumb to IF opposition to place pegs into board x 40      Yellow pincher x 40    Alternated red and green hand griper x 40. Pressing thumb into putty, gripping with hand, manipulating into hand 5' alternately. Green thumb pincher x 40. Red thumb pincher x 40. Manipulating pennies in hand translating from ulnar to radial hand utilizing full thumb flexion. Alternating red and green hand griper 40 x 2. Strengthening  Blue foam cube pinching, gripping and maniplating in hand. Issued for and instructed in for home. Therapeutic Exercise and NMR:  [x] (70957) Provided verbal/tactile cueing for activities related to strengthening, flexibility, endurance, ROM  for improvements in scapular, scapulothoracic and UE control with self care, reaching, carrying, lifting, house/yardwork, driving/computer work.    [] (54991) Provided verbal/tactile cueing for activities related to improving balance, coordination, kinesthetic sense, posture, motor skill, proprioception  to assist with  scapular, scapulothoracic and UE control with self care, reaching, carrying, lifting, house/yardwork, driving/computer work.   [] Comments:    Therapeutic Activities:    [] (63111 or 02948) Provided verbal/tactile cueing for activities related to improving balance, coordination, kinesthetic sense, posture, motor skill, proprioception and motor activation to allow for proper function of scapular, scapulothoracic and UE control with self care, carrying, lifting, driving/computer work  [] Comments:    Home Exercise Program:    [x] (59058) Reviewed/Progressed HEP activities related to strengthening, flexibility, endurance, ROM of scapular, scapulothoracic and UE control with self care, reaching, carrying, lifting, house/yardwork, driving/computer work  [] (54610) Reviewed/Progressed HEP activities related to improving balance, coordination, kinesthetic sense, posture, motor skill, proprioception of scapular, scapulothoracic and UE control with self care, reaching, carrying, lifting, house/yardwork, driving/computer work    [x] Comments: see sheet    Manual Treatments:  PROM / STM / Oscillations-Mobs:  G-I, II, III, IV (PA's, Inf., Post.)  [] (02796) Provided manual therapy to mobilize soft tissue/joints of cervical/CT, scapular GHJ and UE for the purpose of modulating pain, promoting relaxation,  increasing ROM, reducing/eliminating soft tissue swelling/inflammation/restriction, improving soft tissue extensibility and allowing for proper ROM for normal function with self care, reaching, carrying, lifting, house/yardwork, driving/computer work  [] Comments: 2' STM, retrograde massage    ADL Training:  [] (26997) Provided self-care/home management training related to activities of daily living and compensatory training, and/or use of adaptive equipment   [] Comments:     Splinting:  [] Fabrication of:   [] (96294) Orthotic/Prosthetic Management, subsequent encounter  [] (33789) Orthotic management and training (fitting and assessment)  [] Comments:    Charges:  Timed Code Treatment Minutes: 30   Total Treatment Minutes: 50     [] EVAL (LOW) 22 [id number]   [] OT Re-eval (04725)  [] EVAL (MOD) 79483   [] EVAL (HIGH) 84205       [x] Keith (96441) x 2     [] TODTG(22610)  [] NMR (77015) x     [] Estim (attended) (40296)   [] Manual (15639) x     [] US (42063)  [] TA (25455) x     [] Paraffin (63230)  [] ADL  (27264) x    [] Splint/L code:    [] Estim (unattended) (73831)  [x] Fluidotherapy (81956)  [] Other:    GOALS:  Short Term Goals: To be achieved in: 2 weeks  1. Independent in HEP and progression per patient tolerance, in order to prevent re-injury. 2. Patient will have a decrease in pain to facilitate improvement in movement, function, and ADLs as indicated by Functional Deficits. Long Term Goals to be achieved in 4-6  weeks, including patient directed goals to address identified performance deficits:  1) Pt to be independent in graded HEP progression with a good level of effort and compliance. 2) Pt to report a score of 30 % or less on the Quick DASH disability questionnaire for increased performance with carrying, moving, and handling objects. 3) Pt will demonstrate increased ROM to R thumb flexion to </=0.5cm to [facility] for improved independence with dressing/donning bra. 4) Pt will demonstrate increased strength to R  >/= 75% of L for improved independence with opening jars  5) Pt will demonstrate increased pinch strengths to R >/= 75% of L for improved independence with use of feeding utensils. 6) Pt will have a decrease in pain to 0-1/10 to facilitate ease with driving.  )      Progression Towards Functional goals:  [x] Patient is progressing as expected towards functional goals listed. [] Progression is slowed due to complexities listed. [] Progression has been slowed due to co-morbidities. [] Plan just implemented, too soon to assess goals progression  [] All goals are met  [x] Other:  Gains in lateral pinch strength    ASSESSMENT:  Good gains in ROM, beginning work on strength.     Treatment/Activity Tolerance:  [x] Patient tolerated treatment well [] Patient limited by

## 2019-05-17 ENCOUNTER — HOSPITAL ENCOUNTER (OUTPATIENT)
Dept: OCCUPATIONAL THERAPY | Age: 34
Setting detail: THERAPIES SERIES
Discharge: HOME | End: 2019-05-17
Payer: COMMERCIAL

## 2019-05-17 PROCEDURE — 97110 THERAPEUTIC EXERCISES: CPT | Performed by: OCCUPATIONAL THERAPIST

## 2019-05-17 PROCEDURE — 97530 THERAPEUTIC ACTIVITIES: CPT | Performed by: OCCUPATIONAL THERAPIST

## 2019-05-17 NOTE — FLOWSHEET NOTE
[facility]  [address],  [address], [address] [place] [address]  Phone: [phone] Fax: [phone]      Hand Therapy Daily Treatment Note  Date:  2019    Patient: Davi Schuster   : 1985   MRN: [id number]  Referring Physician: Referring Practitioner: Grover Padilla MD       Medical Diagnosis Information:  Diagnosis: F24.981A (ICD-10-CM) - Closed nondisplaced fracture of distal phalanx of right thumb    Treatment Diagnosis: R thumb/hand stiffness - M25.641                                         Insurance information:  [place]  /Date of Injury:19  Date of Surgery:NA      Visit # Insurance Allowable   5      Date of Patient follow up with Physician: 19    Progress Note: []  Yes  [x]  No  Next due by: Visit #10      Latex Allergy:  []No      []Yes  Pacemaker:  [] No       [] Yes     Preferred Language for Healthcare:   [x]English       []other:    Subjective - Feel like she may have over did it with her thumb and it is sore.      Patient reported deficits/history of current problem: slammed thumb in car door on 19; referred to hand therapy for splint fabrication (tip protector) on 19; seen by MD on  and referred to therapy for progression of ROM and desensitization     Functional Disability Index: Quick DASH score - 52% taken on 2019    Pain level:  1-2/10       Comorbidities Affecting Functional Performance:             [x]Anxiety (F41.9)/Depression (F32.9)     []Diabetes Type 1(E10.65) or 2 (E11.65)           []Rheumatoid Arthritis (M05.9)  []Fibromyalgia (M79.7)  []Neuropathy(G60.9)  []Osteoarthritis(M19.91)  []None      []Other:       RESTRICTIONS/PRECAUTIONS:  Off work for two more weeks until next MD appointment, ok to begin resistve  an dpinch activities per MD    OBJECTIVE:                                                       4/29/19                 5/6/19                5/10/19           5/14/19 and instructed in for home. Griping and manipulating red putty. Therapeutic Exercise and NMR:  [x] (25763) Provided verbal/tactile cueing for activities related to strengthening, flexibility, endurance, ROM  for improvements in scapular, scapulothoracic and UE control with self care, reaching, carrying, lifting, house/yardwork, driving/computer work.    [] (86130) Provided verbal/tactile cueing for activities related to improving balance, coordination, kinesthetic sense, posture, motor skill, proprioception  to assist with  scapular, scapulothoracic and UE control with self care, reaching, carrying, lifting, house/yardwork, driving/computer work.   [] Comments:    Therapeutic Activities:    [x] (29673 or 84433) Provided verbal/tactile cueing for activities related to improving balance, coordination, kinesthetic sense, posture, motor skill, proprioception and motor activation to allow for proper function of scapular, scapulothoracic and UE control with self care, carrying, lifting, driving/computer work  [] Comments:    Home Exercise Program:    [x] (99319) Reviewed/Progressed HEP activities related to strengthening, flexibility, endurance, ROM of scapular, scapulothoracic and UE control with self care, reaching, carrying, lifting, house/yardwork, driving/computer work  [] (54502) Reviewed/Progressed HEP activities related to improving balance, coordination, kinesthetic sense, posture, motor skill, proprioception of scapular, scapulothoracic and UE control with self care, reaching, carrying, lifting, house/yardwork, driving/computer work    [x] Comments: see sheet    Manual Treatments:  PROM / STM / Oscillations-Mobs:  G-I, II, III, IV (PA's, Inf., Post.)  [] (90858) Provided manual therapy to mobilize soft tissue/joints of cervical/CT, scapular GHJ and UE for the purpose of modulating pain, promoting relaxation,  increasing ROM, reducing/eliminating soft tissue swelling/inflammation/restriction, improving soft tissue extensibility and allowing for proper ROM for normal function with self care, reaching, carrying, lifting, house/yardwork, driving/computer work  [] Comments: 2' STM, retrograde massage    ADL Training:  [] (68992) Provided self-care/home management training related to activities of daily living and compensatory training, and/or use of adaptive equipment   [] Comments:     Splinting:  [] Fabrication of:   [] (81789) Orthotic/Prosthetic Management, subsequent encounter  [] (04717) Orthotic management and training (fitting and assessment)  [] Comments:    Charges:  Timed Code Treatment Minutes: 40   Total Treatment Minutes: 40     [] EVAL (LOW) 72652   [] OT Re-eval (37456)  [] EVAL (MOD) 33698   [] EVAL (HIGH) [phone]       [] Keith (75147) x 1     [] PXZAM(21623)  [] NMR (17576) x     [] Estim (attended) (75988)   [] Manual ([id number]) x     [] US (03490)  [x] TA (25033) x   2  [] Paraffin (02208)  [] ADL  ([phone]) x    [] Splint/L code:    [] Estim (unattended) 33 93 31)  [x] Fluidotherapy (93710)  [] Other:    GOALS:  Short Term Goals: To be achieved in: 2 weeks  1. Independent in HEP and progression per patient tolerance, in order to prevent re-injury. 2. Patient will have a decrease in pain to facilitate improvement in movement, function, and ADLs as indicated by Functional Deficits. Long Term Goals to be achieved in 4-6  weeks, including patient directed goals to address identified performance deficits:  1) Pt to be independent in graded HEP progression with a good level of effort and compliance. 2) Pt to report a score of 30 % or less on the Quick DASH disability questionnaire for increased performance with carrying, moving, and handling objects. MET  3) Pt will demonstrate increased ROM to R thumb flexion to </=0.5cm to [facility] for improved independence with dressing/donning bra.  MET  4) Pt will demonstrate increased strength to R  >/= 75% of L for improved independence with opening jars MET  5) Pt will demonstrate increased pinch strengths to R >/= 75% of L for improved independence with use of feeding utensils. MET  6) Pt will have a decrease in pain to 0-1/10 to facilitate ease with driving. MET  )      Progression Towards Functional goals:  [x] Patient is progressing as expected towards functional goals listed. [] Progression is slowed due to complexities listed. [] Progression has been slowed due to co-morbidities. [] Plan just implemented, too soon to assess goals progression  [] All goals are met  [x] Other:  All goals met as written. Patient  Feels like she is ready to RTW. ASSESSMENT:  Good gains in ROM and pinch strength.  strength has been a little variable but patient states she has neck issues and has been having spasms in her upper back and shoulders lately. Treatment/Activity Tolerance:  [x] Patient tolerated treatment well [] Patient limited by fatique  [] Patient limited by pain  [] Patient limited by other medical complications  [] Other:     Prognosis: [x] Good [] Fair  [] Poor    Patient Requires Follow-up: [] Yes  [x] No    PLAN: See eval  [] Continue per plan of care [] Alter current plan (see comments)  [] Plan of care initiated [] Hold pending MD visit [x] Discharge    If patient does not return for further follow ups after this date, please consider this as the patient's discharge from occupational therapy.     Electronically signed by: Nina Bedoya [place], [place] - [id number]

## 2019-05-20 ENCOUNTER — OFFICE VISIT (OUTPATIENT)
Dept: ORTHOPEDIC SURGERY | Age: 34
End: 2019-05-20
Payer: COMMERCIAL

## 2019-05-20 DIAGNOSIS — S62.524A CLOSED NONDISPLACED FRACTURE OF DISTAL PHALANX OF RIGHT THUMB, INITIAL ENCOUNTER: Primary | ICD-10-CM

## 2019-05-20 PROCEDURE — 99212 OFFICE O/P EST SF 10 MIN: CPT | Performed by: ORTHOPAEDIC SURGERY

## 2019-05-20 NOTE — LETTER
[facility]  [address]   [address] [facility] [address]  Phone: [phone]  Fax: [phone]    Gilson Gill MD        May 20, 2019     Patient: Chyna Olivares   YOB: 1985   Date of Visit: 5/20/2019       To Whom It May Concern: It is my medical opinion that Chyna Olivares may return to work on 5/21/19 with no restrictions. If you have any questions or concerns, please don't hesitate to call.     Sincerely,      Niki Padilla MD

## 2019-06-20 ENCOUNTER — OFFICE VISIT (OUTPATIENT)
Dept: ORTHOPEDIC SURGERY | Age: 34
End: 2019-06-20
Payer: COMMERCIAL

## 2019-06-20 DIAGNOSIS — S62.524D CLOSED NONDISPLACED FRACTURE OF DISTAL PHALANX OF RIGHT THUMB WITH ROUTINE HEALING, SUBSEQUENT ENCOUNTER: Primary | ICD-10-CM

## 2019-06-20 PROCEDURE — 99212 OFFICE O/P EST SF 10 MIN: CPT | Performed by: ORTHOPAEDIC SURGERY

## 2019-06-20 NOTE — PROGRESS NOTES
Assessment: 64-year-old female presenting with history of right thumb injury after slamming in car door  1. Right nondisplaced thumb distal phalanx/tuft fracture with associated subungual hematoma       Treatment Plan: The patient continues to make progress with regards to functional use of her thumb. Sensitivity range of motion have all improved and I expect her strength with pinching  to continue to improve as well. She appears to have a healthy nail and I expect this to continue to grow. If she begins to have any painful nail growth, any new swelling or any other symptoms she may call, otherwise we will plan to see her back on an as-needed basis for her right thumb injury    No follow-ups on file. History of Present Illness  Jaime Wang a 37 y.o. female , right-hand-dominant, presenting with history of right thumb injury  The patient sustained a crush injury to her thumb on her right thumb was crushed in a car to her. Date of injury: 4/7/2019  She is now nearly 10 weeks status post injury. She has noticed new nail growth in her previous now plate was loosened and fell off  Sensitivity, range of motion and strength have all improved. She denies any other complaints today, has not had any painful nail growth      Review of Systems  Pertinent items are noted in HPI  Review of systems reviewed from Patient History Form dated on 4/11/19 and available in the patient's chart under the Media tab. Vital Signs  There were no vitals filed for this visit. Physical Exam  Constitutional:  Patient is well-nourished and demonstrates normal hygiene. Mental Status:  Patient is alert and oriented to person, place and time. Skin:  Intact, no rashes or lesions.      Right thumb/hand Examination  Inspection: No thumb or nail swelling, new nail growth is apparent with healthy nail plate and surrounding nail sulcus and eponychial fold, no additional skin changes     Palpation:   Patient tolerates palpation throughout the thumb without any discomfort, n no obvious hypersensitivity throughout palpation of the thumb tip and no crepitus or instability  No palpable crepitus or fluctuance and nontender throughout the remainder of thumb     Range of Motion:  Symmetrical thumb range of motion in all planes compared with contralateral thumb     Strength:  Active 5 out of 5 EPL and FPL with limited motion and testing        Special Tests:  Sensation globally altered and thumb tip but present and radial and ulnar aspects of thumb tip  Capillary refill brisk in thumb tip           Capillary refill brisk all fingers, symmetric  Gross sensation intact to light touch median/ulnar/radial nerves  Sensation intact to radial/ulnar aspect of fingertip        Radiology:    X-rays obtained and reviewed in office:  No additional images obtained today    Additional Diagnostic Test Findings:    Office Procedures:  No orders of the defined types were placed in this encounter. Alvin Reyes MD  Orthopaedic Surgeon, [address]    Contact Information:  Aditya Morton: [phone]  Clinical )    This dictation was performed with a verbal recognition program [facility]) and it was checked for errors. It is possible that there are still dictated errors within this office note. If so, please bring any errors to my attention for an addendum. All efforts were made to ensure that this office note is accurate.

## 2020-05-24 ENCOUNTER — APPOINTMENT (OUTPATIENT)
Dept: GENERAL RADIOLOGY | Age: 35
End: 2020-05-24
Payer: COMMERCIAL

## 2020-05-24 ENCOUNTER — HOSPITAL ENCOUNTER (EMERGENCY)
Age: 35
Discharge: HOME | End: 2020-05-24
Attending: EMERGENCY MEDICINE
Payer: COMMERCIAL

## 2020-05-24 VITALS
HEART RATE: 85 BPM | DIASTOLIC BLOOD PRESSURE: 80 MMHG | HEIGHT: 65 IN | BODY MASS INDEX: 38.32 KG/M2 | WEIGHT: 230 LBS | RESPIRATION RATE: 21 BRPM | OXYGEN SATURATION: 99 % | SYSTOLIC BLOOD PRESSURE: 124 MMHG | TEMPERATURE: 98 F

## 2020-05-24 LAB
A/G RATIO: 1.4 (ref 1.1–2.2)
ALBUMIN SERPL-MCNC: 4.3 G/DL (ref 3.4–5)
ALP BLD-CCNC: 87 U/L (ref 40–129)
ALT SERPL-CCNC: 31 U/L (ref 10–40)
ANION GAP SERPL CALCULATED.3IONS-SCNC: 16 MMOL/L (ref 3–16)
AST SERPL-CCNC: 25 U/L (ref 15–37)
BASOPHILS ABSOLUTE: 0.1 K/UL (ref 0–0.2)
BASOPHILS RELATIVE PERCENT: 0.6 %
BILIRUB SERPL-MCNC: <0.2 MG/DL (ref 0–1)
BUN BLDV-MCNC: 15 MG/DL (ref 7–20)
CALCIUM SERPL-MCNC: 9.3 MG/DL (ref 8.3–10.6)
CHLORIDE BLD-SCNC: 101 MMOL/L (ref 99–110)
CO2: 25 MMOL/L (ref 21–32)
CREAT SERPL-MCNC: <0.5 MG/DL (ref 0.6–1.1)
EOSINOPHILS ABSOLUTE: 0.2 K/UL (ref 0–0.6)
EOSINOPHILS RELATIVE PERCENT: 2 %
GFR AFRICAN AMERICAN: >60
GFR NON-AFRICAN AMERICAN: >60
GLOBULIN: 3.1 G/DL
GLUCOSE BLD-MCNC: 123 MG/DL (ref 70–99)
HCT VFR BLD CALC: 39.6 % (ref 36–48)
HEMOGLOBIN: 13.8 G/DL (ref 12–16)
LYMPHOCYTES ABSOLUTE: 3.8 K/UL (ref 1–5.1)
LYMPHOCYTES RELATIVE PERCENT: 34.3 %
MCH RBC QN AUTO: 33.1 PG (ref 26–34)
MCHC RBC AUTO-ENTMCNC: 34.8 G/DL (ref 31–36)
MCV RBC AUTO: 95.2 FL (ref 80–100)
MONOCYTES ABSOLUTE: 0.5 K/UL (ref 0–1.3)
MONOCYTES RELATIVE PERCENT: 4.1 %
NEUTROPHILS ABSOLUTE: 6.5 K/UL (ref 1.7–7.7)
NEUTROPHILS RELATIVE PERCENT: 59 %
PDW BLD-RTO: 12.5 % (ref 12.4–15.4)
PLATELET # BLD: 333 K/UL (ref 135–450)
PMV BLD AUTO: 8.8 FL (ref 5–10.5)
POTASSIUM REFLEX MAGNESIUM: 4.3 MMOL/L (ref 3.5–5.1)
RBC # BLD: 4.16 M/UL (ref 4–5.2)
SODIUM BLD-SCNC: 142 MMOL/L (ref 136–145)
TOTAL PROTEIN: 7.4 G/DL (ref 6.4–8.2)
TROPONIN: <0.01 NG/ML
WBC # BLD: 11.1 K/UL (ref 4–11)

## 2020-05-24 PROCEDURE — 96361 HYDRATE IV INFUSION ADD-ON: CPT

## 2020-05-24 PROCEDURE — 96374 THER/PROPH/DIAG INJ IV PUSH: CPT

## 2020-05-24 PROCEDURE — 6360000002 HC RX W HCPCS: Performed by: EMERGENCY MEDICINE

## 2020-05-24 PROCEDURE — 85025 COMPLETE CBC W/AUTO DIFF WBC: CPT

## 2020-05-24 PROCEDURE — 84484 ASSAY OF TROPONIN QUANT: CPT

## 2020-05-24 PROCEDURE — 80053 COMPREHEN METABOLIC PANEL: CPT

## 2020-05-24 PROCEDURE — 71046 X-RAY EXAM CHEST 2 VIEWS: CPT

## 2020-05-24 PROCEDURE — 93005 ELECTROCARDIOGRAM TRACING: CPT | Performed by: EMERGENCY MEDICINE

## 2020-05-24 PROCEDURE — 99285 EMERGENCY DEPT VISIT HI MDM: CPT

## 2020-05-24 PROCEDURE — 2580000003 HC RX 258: Performed by: EMERGENCY MEDICINE

## 2020-05-24 RX ORDER — ZICONOTIDE 25 UG/ML
INJECTION, SOLUTION INTRATHECAL
COMMUNITY

## 2020-05-24 RX ORDER — 0.9 % SODIUM CHLORIDE 0.9 %
1000 INTRAVENOUS SOLUTION INTRAVENOUS ONCE
Status: COMPLETED | OUTPATIENT
Start: 2020-05-24 | End: 2020-05-24

## 2020-05-24 RX ORDER — IBUPROFEN 600 MG/1
600 TABLET ORAL EVERY 6 HOURS PRN
Qty: 30 TABLET | Refills: 0 | Status: SHIPPED | OUTPATIENT
Start: 2020-05-24

## 2020-05-24 RX ORDER — KETOROLAC TROMETHAMINE 30 MG/ML
30 INJECTION, SOLUTION INTRAMUSCULAR; INTRAVENOUS ONCE
Status: COMPLETED | OUTPATIENT
Start: 2020-05-24 | End: 2020-05-24

## 2020-05-24 RX ADMIN — KETOROLAC TROMETHAMINE 30 MG: 30 INJECTION, SOLUTION INTRAMUSCULAR at 01:50

## 2020-05-24 RX ADMIN — SODIUM CHLORIDE 1000 ML: 9 INJECTION, SOLUTION INTRAVENOUS at 01:50

## 2020-05-24 ASSESSMENT — PAIN DESCRIPTION - ORIENTATION
ORIENTATION: MID
ORIENTATION: MID

## 2020-05-24 ASSESSMENT — PAIN DESCRIPTION - LOCATION
LOCATION: CHEST
LOCATION: CHEST

## 2020-05-24 ASSESSMENT — PAIN DESCRIPTION - PAIN TYPE
TYPE: ACUTE PAIN
TYPE: ACUTE PAIN

## 2020-05-24 ASSESSMENT — PAIN SCALES - GENERAL
PAINLEVEL_OUTOF10: 1
PAINLEVEL_OUTOF10: 2
PAINLEVEL_OUTOF10: 2

## 2020-05-24 ASSESSMENT — PAIN DESCRIPTION - DESCRIPTORS: DESCRIPTORS: OTHER (COMMENT)

## 2020-05-24 ASSESSMENT — PAIN DESCRIPTION - FREQUENCY: FREQUENCY: INTERMITTENT

## 2020-05-25 LAB
EKG ATRIAL RATE: 94 BPM
EKG DIAGNOSIS: NORMAL
EKG P AXIS: 62 DEGREES
EKG P-R INTERVAL: 148 MS
EKG Q-T INTERVAL: 338 MS
EKG QRS DURATION: 76 MS
EKG QTC CALCULATION (BAZETT): 422 MS
EKG R AXIS: 14 DEGREES
EKG T AXIS: 61 DEGREES
EKG VENTRICULAR RATE: 94 BPM

## 2020-05-25 PROCEDURE — 93010 ELECTROCARDIOGRAM REPORT: CPT | Performed by: INTERNAL MEDICINE

## 2024-06-18 ENCOUNTER — HOSPITAL ENCOUNTER (EMERGENCY)
Age: 39
Discharge: HOME | End: 2024-06-18
Payer: COMMERCIAL

## 2024-06-18 VITALS
TEMPERATURE: 98.8 F | HEIGHT: 66 IN | OXYGEN SATURATION: 96 % | RESPIRATION RATE: 15 BRPM | BODY MASS INDEX: 28.93 KG/M2 | DIASTOLIC BLOOD PRESSURE: 88 MMHG | WEIGHT: 180 LBS | HEART RATE: 81 BPM | SYSTOLIC BLOOD PRESSURE: 120 MMHG

## 2024-06-18 DIAGNOSIS — N30.00 ACUTE CYSTITIS WITHOUT HEMATURIA: Primary | ICD-10-CM

## 2024-06-18 LAB
ALBUMIN SERPL-MCNC: 4.4 G/DL (ref 3.4–5)
ALBUMIN/GLOB SERPL: 1.4 {RATIO} (ref 1.1–2.2)
ALP SERPL-CCNC: 111 U/L (ref 40–129)
ALT SERPL-CCNC: 34 U/L (ref 10–40)
ANION GAP SERPL CALCULATED.3IONS-SCNC: 12 MMOL/L (ref 3–16)
AST SERPL-CCNC: 22 U/L (ref 15–37)
BACTERIA URNS QL MICRO: ABNORMAL /HPF
BASOPHILS # BLD: 0.1 K/UL (ref 0–0.2)
BASOPHILS NFR BLD: 0.8 %
BILIRUB SERPL-MCNC: 0.4 MG/DL (ref 0–1)
BILIRUB UR QL STRIP.AUTO: NEGATIVE
BUN SERPL-MCNC: 12 MG/DL (ref 7–20)
CALCIUM SERPL-MCNC: 8.9 MG/DL (ref 8.3–10.6)
CHLORIDE SERPL-SCNC: 101 MMOL/L (ref 99–110)
CLARITY UR: CLEAR
CO2 SERPL-SCNC: 25 MMOL/L (ref 21–32)
COLOR UR: YELLOW
CREAT SERPL-MCNC: <0.5 MG/DL (ref 0.6–1.1)
DEPRECATED RDW RBC AUTO: 12.9 % (ref 12.4–15.4)
EOSINOPHIL # BLD: 0.6 K/UL (ref 0–0.6)
EOSINOPHIL NFR BLD: 5.9 %
EPI CELLS #/AREA URNS HPF: ABNORMAL /HPF (ref 0–5)
FLUAV RNA RESP QL NAA+PROBE: NOT DETECTED
FLUBV RNA RESP QL NAA+PROBE: NOT DETECTED
GFR SERPLBLD CREATININE-BSD FMLA CKD-EPI: >90 ML/MIN/{1.73_M2}
GLUCOSE SERPL-MCNC: 97 MG/DL (ref 70–99)
GLUCOSE UR STRIP.AUTO-MCNC: NEGATIVE MG/DL
HCG SERPL QL: NEGATIVE
HCT VFR BLD AUTO: 41.6 % (ref 36–48)
HGB BLD-MCNC: 13.7 G/DL (ref 12–16)
HGB UR QL STRIP.AUTO: NEGATIVE
KETONES UR STRIP.AUTO-MCNC: NEGATIVE MG/DL
LEUKOCYTE ESTERASE UR QL STRIP.AUTO: NEGATIVE
LYMPHOCYTES # BLD: 3.9 K/UL (ref 1–5.1)
LYMPHOCYTES NFR BLD: 38.1 %
MCH RBC QN AUTO: 30.3 PG (ref 26–34)
MCHC RBC AUTO-ENTMCNC: 32.9 G/DL (ref 31–36)
MCV RBC AUTO: 92 FL (ref 80–100)
MONOCYTES # BLD: 0.5 K/UL (ref 0–1.3)
MONOCYTES NFR BLD: 5.3 %
NEUTROPHILS # BLD: 5.1 K/UL (ref 1.7–7.7)
NEUTROPHILS NFR BLD: 49.9 %
NITRITE UR QL STRIP.AUTO: POSITIVE
PH UR STRIP.AUTO: 6 [PH] (ref 5–8)
PLATELET # BLD AUTO: 299 K/UL (ref 135–450)
PMV BLD AUTO: 8 FL (ref 5–10.5)
POTASSIUM SERPL-SCNC: 4.1 MMOL/L (ref 3.5–5.1)
PROT SERPL-MCNC: 7.6 G/DL (ref 6.4–8.2)
PROT UR STRIP.AUTO-MCNC: NEGATIVE MG/DL
RBC # BLD AUTO: 4.52 M/UL (ref 4–5.2)
RBC #/AREA URNS HPF: ABNORMAL /HPF (ref 0–4)
SARS-COV-2 RNA RESP QL NAA+PROBE: NOT DETECTED
SODIUM SERPL-SCNC: 138 MMOL/L (ref 136–145)
SP GR UR STRIP.AUTO: >=1.03 (ref 1–1.03)
UA COMPLETE W REFLEX CULTURE PNL UR: ABNORMAL
UA DIPSTICK W REFLEX MICRO PNL UR: YES
URN SPEC COLLECT METH UR: ABNORMAL
UROBILINOGEN UR STRIP-ACNC: 0.2 E.U./DL
WBC # BLD AUTO: 10.2 K/UL (ref 4–11)
WBC #/AREA URNS HPF: ABNORMAL /HPF (ref 0–5)

## 2024-06-18 PROCEDURE — 6370000000 HC RX 637 (ALT 250 FOR IP): Performed by: PHYSICIAN ASSISTANT

## 2024-06-18 PROCEDURE — 87636 SARSCOV2 & INF A&B AMP PRB: CPT

## 2024-06-18 PROCEDURE — 6360000002 HC RX W HCPCS: Performed by: PHYSICIAN ASSISTANT

## 2024-06-18 PROCEDURE — 87186 SC STD MICRODIL/AGAR DIL: CPT

## 2024-06-18 PROCEDURE — 87086 URINE CULTURE/COLONY COUNT: CPT

## 2024-06-18 PROCEDURE — 96361 HYDRATE IV INFUSION ADD-ON: CPT

## 2024-06-18 PROCEDURE — 81001 URINALYSIS AUTO W/SCOPE: CPT

## 2024-06-18 PROCEDURE — 99284 EMERGENCY DEPT VISIT MOD MDM: CPT

## 2024-06-18 PROCEDURE — 80053 COMPREHEN METABOLIC PANEL: CPT

## 2024-06-18 PROCEDURE — 85025 COMPLETE CBC W/AUTO DIFF WBC: CPT

## 2024-06-18 PROCEDURE — 87088 URINE BACTERIA CULTURE: CPT

## 2024-06-18 PROCEDURE — 2580000003 HC RX 258: Performed by: PHYSICIAN ASSISTANT

## 2024-06-18 PROCEDURE — 96374 THER/PROPH/DIAG INJ IV PUSH: CPT

## 2024-06-18 PROCEDURE — 84703 CHORIONIC GONADOTROPIN ASSAY: CPT

## 2024-06-18 RX ORDER — CEFUROXIME AXETIL 250 MG/1
500 TABLET ORAL ONCE
Status: COMPLETED | OUTPATIENT
Start: 2024-06-18 | End: 2024-06-18

## 2024-06-18 RX ORDER — CEFUROXIME AXETIL 250 MG/1
250 TABLET ORAL 2 TIMES DAILY
Qty: 14 TABLET | Refills: 0 | Status: SHIPPED | OUTPATIENT
Start: 2024-06-18 | End: 2024-06-25

## 2024-06-18 RX ORDER — 0.9 % SODIUM CHLORIDE 0.9 %
1000 INTRAVENOUS SOLUTION INTRAVENOUS ONCE
Status: COMPLETED | OUTPATIENT
Start: 2024-06-18 | End: 2024-06-18

## 2024-06-18 RX ORDER — ONDANSETRON 4 MG/1
4 TABLET, FILM COATED ORAL EVERY 8 HOURS PRN
Qty: 20 TABLET | Refills: 0 | Status: SHIPPED | OUTPATIENT
Start: 2024-06-18

## 2024-06-18 RX ORDER — ONDANSETRON 2 MG/ML
4 INJECTION INTRAMUSCULAR; INTRAVENOUS ONCE
Status: COMPLETED | OUTPATIENT
Start: 2024-06-18 | End: 2024-06-18

## 2024-06-18 RX ADMIN — ONDANSETRON 4 MG: 2 INJECTION INTRAMUSCULAR; INTRAVENOUS at 14:34

## 2024-06-18 RX ADMIN — CEFUROXIME AXETIL 500 MG: 250 TABLET ORAL at 15:34

## 2024-06-18 RX ADMIN — SODIUM CHLORIDE 1000 ML: 9 INJECTION, SOLUTION INTRAVENOUS at 14:34

## 2024-06-18 ASSESSMENT — LIFESTYLE VARIABLES
HOW OFTEN DO YOU HAVE A DRINK CONTAINING ALCOHOL: NEVER
HOW MANY STANDARD DRINKS CONTAINING ALCOHOL DO YOU HAVE ON A TYPICAL DAY: PATIENT DOES NOT DRINK

## 2024-06-18 ASSESSMENT — PAIN SCALES - GENERAL: PAINLEVEL_OUTOF10: 0

## 2024-06-18 ASSESSMENT — PAIN - FUNCTIONAL ASSESSMENT
PAIN_FUNCTIONAL_ASSESSMENT: NONE - DENIES PAIN
PAIN_FUNCTIONAL_ASSESSMENT: NONE - DENIES PAIN

## 2024-06-18 NOTE — ED PROVIDER NOTES
without hematuria          DISPOSITION/PLAN     DISPOSITION Decision To Discharge 06/18/2024 03:19:18 PM      PATIENT REFERRED TO:  [facility]  ED  [address]  [facility] [address] [phone]  Go to   If symptoms worsen    Angelique Antonio, DO            DISCHARGE MEDICATIONS:  New Prescriptions    CEFUROXIME (CEFTIN) 250 MG TABLET    Take 1 tablet by mouth 2 times daily for 7 days    ONDANSETRON (ZOFRAN) 4 MG TABLET    Take 1 tablet by mouth every 8 hours as needed for Nausea       DISCONTINUED MEDICATIONS:  Discontinued Medications    No medications on file              (Please note that portions of this note were completed with a voice recognition program.  Efforts were made to edit the dictations but occasionally words are mis-transcribed.)    JHONATHAN Mcgee (electronically signed)            Marce Hernandez PA  06/18/24 4790

## 2024-06-20 LAB
BACTERIA UR CULT: ABNORMAL
ORGANISM: ABNORMAL